# Patient Record
Sex: FEMALE | Race: WHITE | NOT HISPANIC OR LATINO | Employment: FULL TIME | URBAN - METROPOLITAN AREA
[De-identification: names, ages, dates, MRNs, and addresses within clinical notes are randomized per-mention and may not be internally consistent; named-entity substitution may affect disease eponyms.]

---

## 2019-01-28 ENCOUNTER — TELEPHONE (OUTPATIENT)
Dept: SURGERY | Facility: CLINIC | Age: 59
End: 2019-01-28

## 2020-08-06 DIAGNOSIS — Z12.31 ENCOUNTER FOR SCREENING MAMMOGRAM FOR MALIGNANT NEOPLASM OF BREAST: Primary | ICD-10-CM

## 2020-09-09 ENCOUNTER — TELEPHONE (OUTPATIENT)
Dept: ORTHOPEDICS | Facility: CLINIC | Age: 60
End: 2020-09-09

## 2020-09-09 ENCOUNTER — OFFICE VISIT (OUTPATIENT)
Dept: ORTHOPEDICS | Facility: CLINIC | Age: 60
End: 2020-09-09
Payer: MEDICAID

## 2020-09-09 VITALS
RESPIRATION RATE: 20 BRPM | WEIGHT: 108.38 LBS | OXYGEN SATURATION: 98 % | BODY MASS INDEX: 20.46 KG/M2 | HEIGHT: 61 IN | TEMPERATURE: 98 F

## 2020-09-09 DIAGNOSIS — S52.501A CLOSED FRACTURE OF DISTAL END OF RIGHT RADIUS, UNSPECIFIED FRACTURE MORPHOLOGY, INITIAL ENCOUNTER: Primary | ICD-10-CM

## 2020-09-09 PROCEDURE — 99203 OFFICE O/P NEW LOW 30 MIN: CPT | Mod: 57,S$PBB,, | Performed by: ORTHOPAEDIC SURGERY

## 2020-09-09 PROCEDURE — 99214 OFFICE O/P EST MOD 30 MIN: CPT | Mod: PBBFAC,PN | Performed by: ORTHOPAEDIC SURGERY

## 2020-09-09 PROCEDURE — 99999 PR PBB SHADOW E&M-EST. PATIENT-LVL IV: ICD-10-PCS | Mod: PBBFAC,,, | Performed by: ORTHOPAEDIC SURGERY

## 2020-09-09 PROCEDURE — 99203 PR OFFICE/OUTPT VISIT, NEW, LEVL III, 30-44 MIN: ICD-10-PCS | Mod: 57,S$PBB,, | Performed by: ORTHOPAEDIC SURGERY

## 2020-09-09 PROCEDURE — 99999 PR PBB SHADOW E&M-EST. PATIENT-LVL IV: CPT | Mod: PBBFAC,,, | Performed by: ORTHOPAEDIC SURGERY

## 2020-09-09 RX ORDER — FLUTICASONE PROPIONATE 50 MCG
1 SPRAY, SUSPENSION (ML) NASAL DAILY
COMMUNITY
Start: 2020-08-07

## 2020-09-09 RX ORDER — ATORVASTATIN CALCIUM 20 MG/1
20 TABLET, FILM COATED ORAL DAILY
COMMUNITY
Start: 2020-08-07

## 2020-09-09 RX ORDER — LOSARTAN POTASSIUM 50 MG/1
50 TABLET ORAL DAILY
COMMUNITY
Start: 2020-08-07

## 2020-09-09 RX ORDER — ASPIRIN 81 MG/1
81 TABLET ORAL DAILY
COMMUNITY
Start: 2020-08-07

## 2020-09-09 RX ORDER — SODIUM CHLORIDE 9 MG/ML
INJECTION, SOLUTION INTRAVENOUS CONTINUOUS
Status: CANCELLED | OUTPATIENT
Start: 2020-09-09

## 2020-09-09 RX ORDER — DOXEPIN HYDROCHLORIDE 10 MG/1
10 CAPSULE ORAL NIGHTLY
COMMUNITY
Start: 2020-08-07 | End: 2020-09-10

## 2020-09-09 RX ORDER — ERGOCALCIFEROL 1.25 MG/1
50000 CAPSULE ORAL
COMMUNITY
Start: 2020-08-07

## 2020-09-09 RX ORDER — FLUOXETINE HYDROCHLORIDE 20 MG/1
60 CAPSULE ORAL DAILY
COMMUNITY
Start: 2020-08-07 | End: 2020-09-10

## 2020-09-09 RX ORDER — LIDOCAINE HYDROCHLORIDE 10 MG/ML
1 INJECTION, SOLUTION EPIDURAL; INFILTRATION; INTRACAUDAL; PERINEURAL ONCE
Status: DISCONTINUED | OUTPATIENT
Start: 2020-09-09 | End: 2020-09-15 | Stop reason: CLARIF

## 2020-09-09 NOTE — PROGRESS NOTES
Subjective:    Patient ID:  Margie Trevino is a 59 y.o. y.o. female who presents for initial visit for No chief complaint on file.      60 yo female, RHD, reports that she slipped and fell onto her outstretched right hand earlier today injuring her right wrist. She sought initial evaluation at an urgent care facility and was subsequently seen at [facility]note 3] ED. X-rays of the right wrist were obtained and reported to show a displaced distal radius fracture. She was treated with an ortho-glass volar splint and sling. She has been referred for orthopedic follow-up care.           No past medical history on file.     Past Surgical History:   Procedure Laterality Date    AUGMENTATION OF BREAST Bilateral        Review of patient's allergies indicates:  No Known Allergies     No current facility-administered medications for this visit.     Current Outpatient Medications:     HYDROcodone-acetaminophen (NORCO) 5-325 mg per tablet, Take 1 tablet by mouth every 4 (four) hours as needed for Pain., Disp: 18 tablet, Rfl: 0    ibuprofen (ADVIL,MOTRIN) 800 MG tablet, Take 1 tablet (800 mg total) by mouth every 6 (six) hours as needed for Pain., Disp: 20 tablet, Rfl: 0    Social History     Socioeconomic History    Marital status: Single     Spouse name: Not on file    Number of children: Not on file    Years of education: Not on file    Highest education level: Not on file   Occupational History    Not on file   Social Needs    Financial resource strain: Not on file    Food insecurity     Worry: Not on file     Inability: Not on file    Transportation needs     Medical: Not on file     Non-medical: Not on file   Tobacco Use    Smoking status: Current Every Day Smoker     Types: Cigarettes   Substance and Sexual Activity    Alcohol use: Not on file    Drug use: Not on file    Sexual activity: Not on file   Lifestyle    Physical activity     Days per week: Not on file     Minutes per session: Not on file    Stress: Not on  file   Relationships    Social connections     Talks on phone: Not on file     Gets together: Not on file     Attends [employer] service: Not on file     Active member of club or organization: Not on file     Attends meetings of clubs or organizations: Not on file     Relationship status: Not on file   Other Topics Concern    Not on file   Social History Narrative    Not on file        No family history on file.     Review of Systems   Constitutional: Negative for chills and fever.   HENT: Negative for hearing loss.    Eyes: Negative for blurred vision.   Respiratory: Negative for shortness of breath.    Cardiovascular: Negative for chest pain.   Gastrointestinal: Negative for nausea and vomiting.   Genitourinary: Negative for dysuria.   Musculoskeletal: Negative for myalgias.   Skin: Negative for rash.   Neurological: Negative for speech change and loss of consciousness.   Endo/Heme/Allergies: Does not bruise/bleed easily.   Psychiatric/Behavioral: Negative for depression.        Objective:     There were no vitals taken for this visit.    Ortho Exam     58 yo female in NAD; alert, oriented x 3    Right and/wrist: short-arm volar ortho-glass splint in place; N/V intact; no significant swelling hand/digits; no pain passive motion digits    Imaging:     X-rays 3-view right wrist taken today are independently reviewed by me and show a comminuted extra-articular distal radius fracture with dorsal angulation and shortening.       Assessment & Plan:      1. Closed fracture of distal end of right radius, unspecified fracture morphology, initial encounter       1.  Findings, diagnosis, treatment options/risks/benefits were reviewed  2.  Recommend ORIF right distal radius fracture. Surgical risks reviewed including but not limited to infection, wound healing problems, damage to nerves/blood vessels/tendons/ligaments, fracture malunion/non-union, hardware failure, CRPS, wrist/finger stiffness, functional limitation,  persistent pain, reoperation and anesthesia related complications including death. Due to the current coronavirus pandemic despite implementation of recommended precautions, the additional risk of yamilka Covid-19 in the post-operative period was also discussed. Patient voiced understanding of these risks and wishes to proceed with the proposed surgery. Surgery tentatively scheduled for 9/15/2020 as an outpatient procedure at [facility] OR.      Patient was informed and understands the risks of surgery are greater for patients with a current condition or history of heart disease, obesity, clotting disorders, recurrent infections, steroid use, current or past smoking, and factors such as sedentary lifestyle and noncompliance with medications, therapy or follow-up. The degree of the increased risk is hard to estimate with any degree of precision.  3.  Continue right arm splint/sling support  4.  Ice/elevation RUE; minimum TID finger ROM exercises to comfort  5.  Vitamin C 500 mg po bid

## 2020-09-09 NOTE — TELEPHONE ENCOUNTER
----- Message from Jamal Trinidad sent at 9/9/2020  1:23 PM CDT -----  Same Day Appointment Request    Was an appointment with another provider offered?     Reason for FST appt.: displaced fracture of wrist, per Dr. Robertson  Communication Preference:  Additional Information: Pls call pt at [phone], pt currently in ED

## 2020-09-14 ENCOUNTER — TELEPHONE (OUTPATIENT)
Dept: ORTHOPEDICS | Facility: CLINIC | Age: 60
End: 2020-09-14

## 2020-09-14 NOTE — TELEPHONE ENCOUNTER
Spoke with pt. Advised pt that Dr Robertson willjose refill pain medication after surgery tomorrow. All questions answered. Pt verbalized understanding.

## 2020-09-14 NOTE — TELEPHONE ENCOUNTER
----- Message from Summer Wall sent at 9/14/2020  9:11 AM CDT -----  Pt#     Refill:  HYDROcodone-acetaminophen (NORCO) 5-325 mg per tablet      [facility] on [address] phone

## 2020-09-15 PROBLEM — S52.501A CLOSED FRACTURE OF RIGHT DISTAL RADIUS: Status: ACTIVE | Noted: 2020-09-15

## 2020-09-18 ENCOUNTER — TELEPHONE (OUTPATIENT)
Dept: ORTHOPEDICS | Facility: CLINIC | Age: 60
End: 2020-09-18

## 2020-09-18 NOTE — TELEPHONE ENCOUNTER
Spoke with pt. Pt states she is doing well following surgery. Pt states she has scheduled her post op visit. All questions answered. Pt verbalized understanding.

## 2020-09-18 NOTE — TELEPHONE ENCOUNTER
----- Message from Tawny San sent at 9/18/2020  9:40 AM CDT -----  Margie Trevino calling regardinga missed call from Zohra. please call back [phone]

## 2020-09-29 ENCOUNTER — OFFICE VISIT (OUTPATIENT)
Dept: ORTHOPEDICS | Facility: CLINIC | Age: 60
End: 2020-09-29
Payer: MEDICAID

## 2020-09-29 VITALS
BODY MASS INDEX: 20.39 KG/M2 | SYSTOLIC BLOOD PRESSURE: 145 MMHG | HEIGHT: 61 IN | HEART RATE: 68 BPM | WEIGHT: 108 LBS | DIASTOLIC BLOOD PRESSURE: 83 MMHG

## 2020-09-29 DIAGNOSIS — S52.501D CLOSED FRACTURE OF DISTAL END OF RIGHT RADIUS WITH ROUTINE HEALING, UNSPECIFIED FRACTURE MORPHOLOGY, SUBSEQUENT ENCOUNTER: Primary | ICD-10-CM

## 2020-09-29 PROCEDURE — 99999 PR PBB SHADOW E&M-EST. PATIENT-LVL IV: CPT | Mod: PBBFAC,,, | Performed by: ORTHOPAEDIC SURGERY

## 2020-09-29 PROCEDURE — 99024 PR POST-OP FOLLOW-UP VISIT: ICD-10-PCS | Mod: ,,, | Performed by: ORTHOPAEDIC SURGERY

## 2020-09-29 PROCEDURE — 99999 PR PBB SHADOW E&M-EST. PATIENT-LVL IV: ICD-10-PCS | Mod: PBBFAC,,, | Performed by: ORTHOPAEDIC SURGERY

## 2020-09-29 PROCEDURE — 99024 POSTOP FOLLOW-UP VISIT: CPT | Mod: ,,, | Performed by: ORTHOPAEDIC SURGERY

## 2020-09-29 PROCEDURE — 99214 OFFICE O/P EST MOD 30 MIN: CPT | Mod: PBBFAC,PN | Performed by: ORTHOPAEDIC SURGERY

## 2020-09-29 RX ORDER — IBUPROFEN 800 MG/1
800 TABLET ORAL 3 TIMES DAILY
Qty: 90 TABLET | Refills: 0 | Status: SHIPPED | OUTPATIENT
Start: 2020-09-29 | End: 2020-10-29

## 2020-09-29 NOTE — PROGRESS NOTES
"Subjective:    Patient ID:  Margie Trevino is a 59 y.o. y.o. female who presents for f/u visit for Post-op Evaluation and Pain of the Right Wrist      Patient returns for follow-up s/p ORIF right distal radius fracture, DOS: 9/15/2020. Has been comfortable in the splint. Denies any specific problems or complaints. Pain well-controlled with ibuprofen.        Objective:     BP (!) 145/83   Pulse 68   Ht 5' 1" (1.549 m)   Wt 49 kg (108 lb 0.4 oz)   BMI 20.41 kg/m²     Ortho Exam     Right wrist: splint removed; N/V intact; moderate swelling; surgical wound clean/dry/intact, steri-strips in place; limited AROM all planes; painless passive ROM digits      Assessment & Plan:     1. Closed fracture of distal end of right radius with routine healing, unspecified fracture morphology, subsequent encounter        1.  Wound care instructions reviewed  2.  Universal wrist splint; splint wear/care instructions reviewed  3.  Minimum TID wrist/finger ROM exercises to comfort  4.  No lifting heavier than cup of coffee with right hand; no weight bearing RUE  5.  Continue Motrin 800 mg po tid prn; vitamin C 500 mg po bid x additional 4 weeks  6.  OT  7.  Follow-up in one month, x-rays AP/lateral right wrist at that time  "

## 2020-09-30 ENCOUNTER — TELEPHONE (OUTPATIENT)
Dept: ORTHOPEDICS | Facility: CLINIC | Age: 60
End: 2020-09-30

## 2020-09-30 NOTE — TELEPHONE ENCOUNTER
----- Message from Mario Alberto Harvey sent at 9/30/2020  9:34 AM CDT -----  Requesting a call back, regarding brace as well as physical therapy. Please call back.      Contact Info [phone] (ndeb)

## 2020-09-30 NOTE — TELEPHONE ENCOUNTER
Spoke with pt. Pt states she wants to know if she needs to wear the brace all day or just at night. Advised pt she needs to be wearing the brace all day and when she is sleeping just taking it off to shower and do her exercises. Pt states she is still awaiting a phone call from occupational therapy. Advised pt they should be calling her in the next few days. All questions answered. Pt verbalized understanding.

## 2020-11-02 ENCOUNTER — OFFICE VISIT (OUTPATIENT)
Dept: ORTHOPEDICS | Facility: CLINIC | Age: 60
End: 2020-11-02
Payer: MEDICAID

## 2020-11-02 VITALS
HEIGHT: 61 IN | DIASTOLIC BLOOD PRESSURE: 95 MMHG | SYSTOLIC BLOOD PRESSURE: 137 MMHG | WEIGHT: 108 LBS | HEART RATE: 67 BPM | BODY MASS INDEX: 20.39 KG/M2

## 2020-11-02 DIAGNOSIS — S52.501D CLOSED FRACTURE OF DISTAL END OF RIGHT RADIUS WITH ROUTINE HEALING, UNSPECIFIED FRACTURE MORPHOLOGY, SUBSEQUENT ENCOUNTER: Primary | ICD-10-CM

## 2020-11-02 PROCEDURE — 99999 PR PBB SHADOW E&M-EST. PATIENT-LVL III: CPT | Mod: PBBFAC,,, | Performed by: ORTHOPAEDIC SURGERY

## 2020-11-02 PROCEDURE — 99213 OFFICE O/P EST LOW 20 MIN: CPT | Mod: PBBFAC,PN | Performed by: ORTHOPAEDIC SURGERY

## 2020-11-02 PROCEDURE — 99999 PR PBB SHADOW E&M-EST. PATIENT-LVL III: ICD-10-PCS | Mod: PBBFAC,,, | Performed by: ORTHOPAEDIC SURGERY

## 2020-11-02 PROCEDURE — 99024 POSTOP FOLLOW-UP VISIT: CPT | Mod: ,,, | Performed by: ORTHOPAEDIC SURGERY

## 2020-11-02 PROCEDURE — 99024 PR POST-OP FOLLOW-UP VISIT: ICD-10-PCS | Mod: ,,, | Performed by: ORTHOPAEDIC SURGERY

## 2020-11-02 RX ORDER — FLUOXETINE HYDROCHLORIDE 20 MG/1
60 CAPSULE ORAL DAILY
COMMUNITY
Start: 2020-09-30

## 2020-11-03 NOTE — PROGRESS NOTES
"Subjective:    Patient ID:  Margie Trevino is a 59 y.o. y.o. female who presents for f/u visit for Post-op Evaluation and Pain of the Right Wrist      Patient returns for follow-up s/p ORIF right distal radius fracture, DOS: 9/15/2020. Has been wearing splint and attending OT (2 sessions to date). Notes proximal end of suture is showing through skin.        Objective:     BP (!) 137/95 (BP Location: Left arm, Patient Position: Sitting, BP Method: Small (Automatic))   Pulse 67   Ht 5' 1" (1.549 m)   Wt 49 kg (108 lb 0.4 oz)   BMI 20.41 kg/m²     Ortho Exam     Right wrist: N/V intact; mild swelling; surgical wound well-healed with exposed monocryl suture proximally; ROM: 40 extension/30 flexion    Imaging:     X-rays 3-view right wrist taken today are independently reviewed by me and show well-maintained fracture alignment and hardware position; progressive fracture consolidation and diffuse osteopenia noted.       Assessment & Plan:     1. Closed fracture of distal end of right radius with routine healing, unspecified fracture morphology, subsequent encounter        1.  Exposed monocryl suture at proximal wound removed  2.  Wean from right wrist splint  3.  Continue OT, progressive ROM/strengthening to tolerance  4.  Progressive increase right hand use for ADLs to comfort  5.  Follow-up in 6 weeks  "

## 2020-12-14 ENCOUNTER — OFFICE VISIT (OUTPATIENT)
Dept: ORTHOPEDICS | Facility: CLINIC | Age: 60
End: 2020-12-14
Payer: MEDICAID

## 2020-12-14 VITALS
DIASTOLIC BLOOD PRESSURE: 67 MMHG | HEART RATE: 57 BPM | SYSTOLIC BLOOD PRESSURE: 102 MMHG | WEIGHT: 114.31 LBS | HEIGHT: 61 IN | BODY MASS INDEX: 21.58 KG/M2

## 2020-12-14 DIAGNOSIS — S52.501D CLOSED FRACTURE OF DISTAL END OF RIGHT RADIUS WITH ROUTINE HEALING, UNSPECIFIED FRACTURE MORPHOLOGY, SUBSEQUENT ENCOUNTER: Primary | ICD-10-CM

## 2020-12-14 PROCEDURE — 99999 PR PBB SHADOW E&M-EST. PATIENT-LVL III: ICD-10-PCS | Mod: PBBFAC,,, | Performed by: ORTHOPAEDIC SURGERY

## 2020-12-14 PROCEDURE — 99024 POSTOP FOLLOW-UP VISIT: CPT | Mod: ,,, | Performed by: ORTHOPAEDIC SURGERY

## 2020-12-14 PROCEDURE — 99213 OFFICE O/P EST LOW 20 MIN: CPT | Mod: PBBFAC,PN | Performed by: ORTHOPAEDIC SURGERY

## 2020-12-14 PROCEDURE — 99999 PR PBB SHADOW E&M-EST. PATIENT-LVL III: CPT | Mod: PBBFAC,,, | Performed by: ORTHOPAEDIC SURGERY

## 2020-12-14 PROCEDURE — 99024 PR POST-OP FOLLOW-UP VISIT: ICD-10-PCS | Mod: ,,, | Performed by: ORTHOPAEDIC SURGERY

## 2020-12-14 RX ORDER — DOXEPIN HYDROCHLORIDE 10 MG/1
10 CAPSULE ORAL NIGHTLY
COMMUNITY
Start: 2020-12-03

## 2020-12-15 NOTE — PROGRESS NOTES
"Subjective:    Patient ID:  Margie Trevino is a 59 y.o. y.o. female who presents for f/u visit for Injury and Pain of the Right Wrist      Patient returns for follow-up s/p ORIF right distal radius fracture, DOS: 9/15/2020. Notes improving strength and motion. Feels she may be "favoring" her right wrist too much. Has been attending OT.          Objective:     /67 (BP Location: Right arm, Patient Position: Sitting, BP Method: Small (Automatic))   Pulse (!) 57   Ht 5' 1" (1.549 m)   Wt 51.9 kg (114 lb 4.9 oz)   BMI 21.60 kg/m²     Ortho Exam     59 to female in NAD; alert, oriented x 3; normal mood and affect    Head: atraumatic  Eyes: EOM are normal. Right eye exhibits no discharge. Left eye exhibits no discharge  Cardiovascular: normal rate    Pulmonary/Chest: effort normal; no respiratory distress  Abdominal: soft    Right wrist: N/V intact; mild swelling; surgical wound well-healed; no focal tenderness; ROM: 60 extension/45 flexion, full pronation/supination      Imaging:     X-rays 3-view right wrist taken today are independently reviewed by me and show well-maintained fracture alignment and hardware position; progressive fracture consolidation; disuse osteopenia noted.       Assessment & Plan:     1. Closed fracture of distal end of right radius with routine healing, unspecified fracture morphology, subsequent encounter        1.  Continue OT, transitioning to HEP at therapist's discretion  2.  Progressive increase in right hand use to tolerance  3.  Follow-up in 6-8 weeks  "

## 2021-01-13 ENCOUNTER — TELEPHONE (OUTPATIENT)
Dept: ORTHOPEDICS | Facility: CLINIC | Age: 61
End: 2021-01-13

## 2021-02-17 ENCOUNTER — OFFICE VISIT (OUTPATIENT)
Dept: ORTHOPEDICS | Facility: CLINIC | Age: 61
End: 2021-02-17
Payer: MEDICAID

## 2021-02-17 VITALS
SYSTOLIC BLOOD PRESSURE: 116 MMHG | RESPIRATION RATE: 16 BRPM | HEART RATE: 65 BPM | WEIGHT: 117.31 LBS | HEIGHT: 61 IN | BODY MASS INDEX: 22.15 KG/M2 | DIASTOLIC BLOOD PRESSURE: 73 MMHG

## 2021-02-17 DIAGNOSIS — S52.501D CLOSED FRACTURE OF DISTAL END OF RIGHT RADIUS WITH ROUTINE HEALING, UNSPECIFIED FRACTURE MORPHOLOGY, SUBSEQUENT ENCOUNTER: Primary | ICD-10-CM

## 2021-02-17 PROCEDURE — 99213 OFFICE O/P EST LOW 20 MIN: CPT | Mod: PBBFAC,PN | Performed by: ORTHOPAEDIC SURGERY

## 2021-02-17 PROCEDURE — 99212 PR OFFICE/OUTPT VISIT, EST, LEVL II, 10-19 MIN: ICD-10-PCS | Mod: S$PBB,,, | Performed by: ORTHOPAEDIC SURGERY

## 2021-02-17 PROCEDURE — 99999 PR PBB SHADOW E&M-EST. PATIENT-LVL III: ICD-10-PCS | Mod: PBBFAC,,, | Performed by: ORTHOPAEDIC SURGERY

## 2021-02-17 PROCEDURE — 99999 PR PBB SHADOW E&M-EST. PATIENT-LVL III: CPT | Mod: PBBFAC,,, | Performed by: ORTHOPAEDIC SURGERY

## 2021-02-17 PROCEDURE — 99212 OFFICE O/P EST SF 10 MIN: CPT | Mod: S$PBB,,, | Performed by: ORTHOPAEDIC SURGERY

## 2021-02-18 ENCOUNTER — TELEPHONE (OUTPATIENT)
Dept: ORTHOPEDICS | Facility: CLINIC | Age: 61
End: 2021-02-18

## 2021-02-22 ENCOUNTER — TELEPHONE (OUTPATIENT)
Dept: ORTHOPEDICS | Facility: CLINIC | Age: 61
End: 2021-02-22

## 2021-02-23 ENCOUNTER — TELEPHONE (OUTPATIENT)
Dept: ORTHOPEDICS | Facility: CLINIC | Age: 61
End: 2021-02-23

## 2021-03-03 ENCOUNTER — OFFICE VISIT (OUTPATIENT)
Dept: ORTHOPEDICS | Facility: CLINIC | Age: 61
End: 2021-03-03
Payer: MEDICAID

## 2021-03-03 VITALS — HEIGHT: 61 IN | BODY MASS INDEX: 21.36 KG/M2 | TEMPERATURE: 98 F | WEIGHT: 113.13 LBS | RESPIRATION RATE: 18 BRPM

## 2021-03-03 DIAGNOSIS — S52.501D CLOSED FRACTURE OF DISTAL END OF RIGHT RADIUS WITH ROUTINE HEALING, UNSPECIFIED FRACTURE MORPHOLOGY, SUBSEQUENT ENCOUNTER: Primary | ICD-10-CM

## 2021-03-03 PROCEDURE — 99999 PR PBB SHADOW E&M-EST. PATIENT-LVL III: ICD-10-PCS | Mod: PBBFAC,,, | Performed by: ORTHOPAEDIC SURGERY

## 2021-03-03 PROCEDURE — 99999 PR PBB SHADOW E&M-EST. PATIENT-LVL III: CPT | Mod: PBBFAC,,, | Performed by: ORTHOPAEDIC SURGERY

## 2021-03-03 PROCEDURE — 99212 OFFICE O/P EST SF 10 MIN: CPT | Mod: S$PBB,,, | Performed by: ORTHOPAEDIC SURGERY

## 2021-03-03 PROCEDURE — 99212 PR OFFICE/OUTPT VISIT, EST, LEVL II, 10-19 MIN: ICD-10-PCS | Mod: S$PBB,,, | Performed by: ORTHOPAEDIC SURGERY

## 2021-03-03 PROCEDURE — 99213 OFFICE O/P EST LOW 20 MIN: CPT | Mod: PBBFAC,PN | Performed by: ORTHOPAEDIC SURGERY

## 2021-04-16 ENCOUNTER — PATIENT MESSAGE (OUTPATIENT)
Dept: RESEARCH | Facility: HOSPITAL | Age: 61
End: 2021-04-16

## 2021-05-04 ENCOUNTER — TELEPHONE (OUTPATIENT)
Dept: ORTHOPEDICS | Facility: CLINIC | Age: 61
End: 2021-05-04

## 2021-05-21 ENCOUNTER — TELEPHONE (OUTPATIENT)
Dept: ORTHOPEDICS | Facility: CLINIC | Age: 61
End: 2021-05-21

## 2021-05-24 ENCOUNTER — OFFICE VISIT (OUTPATIENT)
Dept: ORTHOPEDICS | Facility: CLINIC | Age: 61
End: 2021-05-24
Payer: MEDICAID

## 2021-05-24 VITALS
DIASTOLIC BLOOD PRESSURE: 81 MMHG | WEIGHT: 118.06 LBS | HEART RATE: 62 BPM | BODY MASS INDEX: 22.29 KG/M2 | HEIGHT: 61 IN | SYSTOLIC BLOOD PRESSURE: 120 MMHG

## 2021-05-24 DIAGNOSIS — M65.4 DE QUERVAIN'S DISEASE (RADIAL STYLOID TENOSYNOVITIS): Primary | ICD-10-CM

## 2021-05-24 PROCEDURE — 99999 PR PBB SHADOW E&M-EST. PATIENT-LVL III: ICD-10-PCS | Mod: PBBFAC,,, | Performed by: ORTHOPAEDIC SURGERY

## 2021-05-24 PROCEDURE — 99999 PR PBB SHADOW E&M-EST. PATIENT-LVL III: CPT | Mod: PBBFAC,,, | Performed by: ORTHOPAEDIC SURGERY

## 2021-05-24 PROCEDURE — 99213 OFFICE O/P EST LOW 20 MIN: CPT | Mod: PBBFAC,PN | Performed by: ORTHOPAEDIC SURGERY

## 2021-05-24 PROCEDURE — 99213 PR OFFICE/OUTPT VISIT, EST, LEVL III, 20-29 MIN: ICD-10-PCS | Mod: S$PBB,,, | Performed by: ORTHOPAEDIC SURGERY

## 2021-05-24 PROCEDURE — 99213 OFFICE O/P EST LOW 20 MIN: CPT | Mod: S$PBB,,, | Performed by: ORTHOPAEDIC SURGERY

## 2021-05-24 RX ORDER — IBUPROFEN 800 MG/1
800 TABLET ORAL 3 TIMES DAILY
COMMUNITY
Start: 2021-05-22 | End: 2022-08-08 | Stop reason: ALTCHOICE

## 2021-05-24 RX ORDER — ALBUTEROL SULFATE 90 UG/1
2 AEROSOL, METERED RESPIRATORY (INHALATION) EVERY 4 HOURS PRN
COMMUNITY
Start: 2021-05-22

## 2021-05-24 RX ORDER — DOXEPIN HYDROCHLORIDE 25 MG/1
25 CAPSULE ORAL DAILY
COMMUNITY
Start: 2021-05-22

## 2021-06-29 DIAGNOSIS — Z12.31 ENCOUNTER FOR SCREENING MAMMOGRAM FOR MALIGNANT NEOPLASM OF BREAST: Primary | ICD-10-CM

## 2021-12-14 ENCOUNTER — TELEPHONE (OUTPATIENT)
Dept: SURGERY | Facility: CLINIC | Age: 61
End: 2021-12-14
Payer: MEDICAID

## 2021-12-20 ENCOUNTER — TELEPHONE (OUTPATIENT)
Dept: ORTHOPEDICS | Facility: CLINIC | Age: 61
End: 2021-12-20
Payer: MEDICAID

## 2022-04-13 ENCOUNTER — TELEPHONE (OUTPATIENT)
Dept: ORTHOPEDICS | Facility: CLINIC | Age: 62
End: 2022-04-13
Payer: MEDICAID

## 2022-04-13 NOTE — TELEPHONE ENCOUNTER
----- Message from Doris Khalil, Patient Care Assistant sent at 4/13/2022 11:02 AM CDT -----  Regarding: appt  Contact: Pt  Pt is requesting a call back in regards to scheduling appt. Pt states she went to the ER on yesterday and was told she has a fracture in her arm. Pt is asking to be seen today if possible. Pt states she needs to be seen as soon as possible and would like to come today due to sister having appt for this evening. Pt is asking that someone contact her back when scheduling this appt.      Pt @ [phone]

## 2022-04-18 ENCOUNTER — TELEPHONE (OUTPATIENT)
Dept: ORTHOPEDICS | Facility: CLINIC | Age: 62
End: 2022-04-18
Payer: MEDICAID

## 2022-04-18 NOTE — TELEPHONE ENCOUNTER
----- Message from Tawny San sent at 4/18/2022  8:51 AM CDT -----  Margie Trevino calling regarding Patient Advice (message) about appt for fracture of head of left radius.  she stated the ER stated the doctors office need to see her within  72 hrs.   [phone]

## 2022-04-19 ENCOUNTER — OFFICE VISIT (OUTPATIENT)
Dept: ORTHOPEDICS | Facility: CLINIC | Age: 62
End: 2022-04-19
Payer: MEDICAID

## 2022-04-19 VITALS
HEIGHT: 61 IN | WEIGHT: 133.94 LBS | BODY MASS INDEX: 25.29 KG/M2 | DIASTOLIC BLOOD PRESSURE: 97 MMHG | HEART RATE: 70 BPM | SYSTOLIC BLOOD PRESSURE: 148 MMHG

## 2022-04-19 DIAGNOSIS — S52.122A CLOSED DISPLACED FRACTURE OF HEAD OF LEFT RADIUS, INITIAL ENCOUNTER: Primary | ICD-10-CM

## 2022-04-19 PROCEDURE — 4010F ACE/ARB THERAPY RXD/TAKEN: CPT | Mod: CPTII,,, | Performed by: ORTHOPAEDIC SURGERY

## 2022-04-19 PROCEDURE — 3077F SYST BP >= 140 MM HG: CPT | Mod: CPTII,,, | Performed by: ORTHOPAEDIC SURGERY

## 2022-04-19 PROCEDURE — 99999 PR PBB SHADOW E&M-EST. PATIENT-LVL V: CPT | Mod: PBBFAC,,, | Performed by: ORTHOPAEDIC SURGERY

## 2022-04-19 PROCEDURE — 3080F PR MOST RECENT DIASTOLIC BLOOD PRESSURE >= 90 MM HG: ICD-10-PCS | Mod: CPTII,,, | Performed by: ORTHOPAEDIC SURGERY

## 2022-04-19 PROCEDURE — 99213 PR OFFICE/OUTPT VISIT, EST, LEVL III, 20-29 MIN: ICD-10-PCS | Mod: S$PBB,,, | Performed by: ORTHOPAEDIC SURGERY

## 2022-04-19 PROCEDURE — 1160F PR REVIEW ALL MEDS BY PRESCRIBER/CLIN PHARMACIST DOCUMENTED: ICD-10-PCS | Mod: CPTII,,, | Performed by: ORTHOPAEDIC SURGERY

## 2022-04-19 PROCEDURE — 3077F PR MOST RECENT SYSTOLIC BLOOD PRESSURE >= 140 MM HG: ICD-10-PCS | Mod: CPTII,,, | Performed by: ORTHOPAEDIC SURGERY

## 2022-04-19 PROCEDURE — 1160F RVW MEDS BY RX/DR IN RCRD: CPT | Mod: CPTII,,, | Performed by: ORTHOPAEDIC SURGERY

## 2022-04-19 PROCEDURE — 99213 OFFICE O/P EST LOW 20 MIN: CPT | Mod: S$PBB,,, | Performed by: ORTHOPAEDIC SURGERY

## 2022-04-19 PROCEDURE — 1159F PR MEDICATION LIST DOCUMENTED IN MEDICAL RECORD: ICD-10-PCS | Mod: CPTII,,, | Performed by: ORTHOPAEDIC SURGERY

## 2022-04-19 PROCEDURE — 3080F DIAST BP >= 90 MM HG: CPT | Mod: CPTII,,, | Performed by: ORTHOPAEDIC SURGERY

## 2022-04-19 PROCEDURE — 99999 PR PBB SHADOW E&M-EST. PATIENT-LVL V: ICD-10-PCS | Mod: PBBFAC,,, | Performed by: ORTHOPAEDIC SURGERY

## 2022-04-19 PROCEDURE — 1159F MED LIST DOCD IN RCRD: CPT | Mod: CPTII,,, | Performed by: ORTHOPAEDIC SURGERY

## 2022-04-19 PROCEDURE — 3008F BODY MASS INDEX DOCD: CPT | Mod: CPTII,,, | Performed by: ORTHOPAEDIC SURGERY

## 2022-04-19 PROCEDURE — 3008F PR BODY MASS INDEX (BMI) DOCUMENTED: ICD-10-PCS | Mod: CPTII,,, | Performed by: ORTHOPAEDIC SURGERY

## 2022-04-19 PROCEDURE — 99215 OFFICE O/P EST HI 40 MIN: CPT | Mod: PBBFAC,PN | Performed by: ORTHOPAEDIC SURGERY

## 2022-04-19 PROCEDURE — 4010F PR ACE/ARB THEARPY RXD/TAKEN: ICD-10-PCS | Mod: CPTII,,, | Performed by: ORTHOPAEDIC SURGERY

## 2022-04-19 RX ORDER — LORAZEPAM 1 MG/1
TABLET ORAL
COMMUNITY
Start: 2022-04-07

## 2022-04-19 RX ORDER — AZELASTINE 1 MG/ML
2 SPRAY, METERED NASAL 2 TIMES DAILY
COMMUNITY
Start: 2022-04-07

## 2022-04-19 RX ORDER — HYDROCODONE BITARTRATE AND ACETAMINOPHEN 5; 325 MG/1; MG/1
1 TABLET ORAL EVERY 6 HOURS PRN
Qty: 10 TABLET | Refills: 0 | OUTPATIENT
Start: 2022-04-19 | End: 2022-08-08

## 2022-04-19 RX ORDER — IBUPROFEN 200 MG
TABLET ORAL
COMMUNITY
Start: 2022-04-07

## 2024-06-05 ENCOUNTER — TELEPHONE (OUTPATIENT)
Dept: CARDIOLOGY | Facility: CLINIC | Age: 64
End: 2024-06-05
Payer: MEDICAID

## 2024-06-05 NOTE — TELEPHONE ENCOUNTER
----- Message from Renetta Polanco sent at 6/5/2024  8:45 AM CDT -----  Regarding: Ext referral  Good morning,    Current pt is being referred to cardio from NP Jennifer Bazzi for Screening for CAD, no appts were populating. I have scanned the referral and records in to media mgr. Please advise or contact pt to schedule and let me know if I can help any further.    Thank you,  Renetta Polanco  Clinic   Ext [phone]

## 2024-06-05 NOTE — TELEPHONE ENCOUNTER
LVM for patient, calling regarding a referral received from HORTENCIA Bazzi for you to be evaluated by a cardiologist in [place].  You may call me back at [phone] to assist in scheduling the appointment.

## 2024-07-08 ENCOUNTER — TELEPHONE (OUTPATIENT)
Dept: CARDIOLOGY | Facility: CLINIC | Age: 64
End: 2024-07-08
Payer: MEDICAID

## 2024-07-08 NOTE — TELEPHONE ENCOUNTER
----- Message from Julian Wilson sent at 7/8/2024  4:44 PM CDT -----  Contact: [phone]  Pt is calling to see if a referral was received from Dr. Fabian. Please call back to further assist.

## 2024-07-08 NOTE — TELEPHONE ENCOUNTER
Spoke with patient, we did receive referral from PCP.  Scheduled next available appointment with provider.  Patient verbalized understanding.

## 2024-10-03 ENCOUNTER — TELEPHONE (OUTPATIENT)
Dept: ORTHOPEDICS | Facility: CLINIC | Age: 64
End: 2024-10-03
Payer: MEDICAID

## 2024-10-03 NOTE — TELEPHONE ENCOUNTER
----- Message from Elsie sent at 10/3/2024 12:07 PM CDT -----  Contact: pt @[phone]  Margie Trevino calling regarding Appointment Access  (message) for #pt is calling to get appt, pt last saw Dr. Robertson on 4/19/22, asking for call back

## 2024-11-06 DIAGNOSIS — M25.551 RIGHT HIP PAIN: Primary | ICD-10-CM

## 2024-11-07 ENCOUNTER — OFFICE VISIT (OUTPATIENT)
Dept: ORTHOPEDICS | Facility: CLINIC | Age: 64
End: 2024-11-07
Payer: MEDICAID

## 2024-11-07 VITALS
SYSTOLIC BLOOD PRESSURE: 110 MMHG | HEART RATE: 74 BPM | DIASTOLIC BLOOD PRESSURE: 86 MMHG | BODY MASS INDEX: 25.1 KG/M2 | WEIGHT: 132.94 LBS | HEIGHT: 61 IN

## 2024-11-07 DIAGNOSIS — M16.11 PRIMARY OSTEOARTHRITIS OF RIGHT HIP: Primary | ICD-10-CM

## 2024-11-07 PROCEDURE — 1159F MED LIST DOCD IN RCRD: CPT | Mod: CPTII,,,

## 2024-11-07 PROCEDURE — 4010F ACE/ARB THERAPY RXD/TAKEN: CPT | Mod: CPTII,,,

## 2024-11-07 PROCEDURE — 3079F DIAST BP 80-89 MM HG: CPT | Mod: CPTII,,,

## 2024-11-07 PROCEDURE — 3008F BODY MASS INDEX DOCD: CPT | Mod: CPTII,,,

## 2024-11-07 PROCEDURE — 99999 PR PBB SHADOW E&M-EST. PATIENT-LVL V: CPT | Mod: PBBFAC,,,

## 2024-11-07 PROCEDURE — 99213 OFFICE O/P EST LOW 20 MIN: CPT | Mod: S$PBB,,,

## 2024-11-07 PROCEDURE — 3074F SYST BP LT 130 MM HG: CPT | Mod: CPTII,,,

## 2024-11-07 PROCEDURE — 99215 OFFICE O/P EST HI 40 MIN: CPT | Mod: PBBFAC,PN

## 2024-11-07 PROCEDURE — 1160F RVW MEDS BY RX/DR IN RCRD: CPT | Mod: CPTII,,,

## 2024-11-07 NOTE — PROGRESS NOTES
Patient ID: Margie Trevino is a 63 y.o. female    Pain of the Right Hip    History of Present Illness:    Margie Trevino presents to clinic for right hip pain. Patient denies known LUZMA. The pain started 2 months ago and is becoming progressively worse. She did mop one day with an industrial mop and had significant low back pain that radiates into medial knee and down into her lower right leg to her foot.  Pain starts in low back, radiates down leg in posterior aspect down to foot.  She reports that the pain is a 10 /10 sharp aching pain today. The pain is affecting ADLs and limiting desired level of activity. Denies any bladder or bowl dysfunction or saddle anesthesia.     Trial of  ibuprofen and norco,   with little improvement.     PCP: Jennifer Bazzi NP    Occupation: [employer] employee and [employer] employee    Ambulating: unassisted  Diabetic: no  Smoking: current  Hx of DVT/PE: no    PAST MEDICAL HISTORY:   Past Medical History:   Diagnosis Date    Asthma     Chronic hepatitis C     HLD (hyperlipidemia)     Hypertension     Vitamin D deficiency      PAST SURGICAL HISTORY:   Past Surgical History:   Procedure Laterality Date    APPENDECTOMY      AUGMENTATION OF BREAST Bilateral     HERNIA REPAIR      OPEN REDUCTION AND INTERNAL FIXATION (ORIF) OF FRACTURE OF DISTAL RADIUS Right 9/15/2020    Procedure: ORIF, FRACTURE, RADIUS, DISTAL;  Surgeon: Iraj Robertson MD;  Location: [facility];  Service: Orthopedics;  Laterality: Right;     FAMILY HISTORY: No family history on file.  SOCIAL HISTORY:   Social History     Occupational History    Not on file   Tobacco Use    Smoking status: Every Day     Current packs/day: 1.00     Types: Cigarettes    Smokeless tobacco: Never   Substance and Sexual Activity    Alcohol use: Yes     Comment: occasionally    Drug use: Never    Sexual activity: Not on file        MEDICATIONS:   Current Outpatient Medications:     albuterol (PROVENTIL/VENTOLIN HFA) 90 mcg/actuation inhaler,  Inhale 2 puffs into the lungs every 4 (four) hours as needed., Disp: , Rfl:     aspirin (ECOTRIN) 81 MG EC tablet, Take 81 mg by mouth once daily., Disp: , Rfl:     atorvastatin (LIPITOR) 20 MG tablet, Take 20 mg by mouth once daily., Disp: , Rfl:     azelastine (ASTELIN) 137 mcg (0.1 %) nasal spray, 2 sprays 2 (two) times daily., Disp: , Rfl:     doxepin (SINEQUAN) 10 MG capsule, Take 10 mg by mouth nightly., Disp: , Rfl:     doxepin (SINEQUAN) 25 MG capsule, Take 25 mg by mouth once daily., Disp: , Rfl:     FLUoxetine 20 MG capsule, Take 60 mg by mouth once daily., Disp: , Rfl:     fluticasone propionate (FLONASE) 50 mcg/actuation nasal spray, 1 spray by Each Nostril route once daily., Disp: , Rfl:     HYDROcodone-acetaminophen (NORCO) 5-325 mg per tablet, Take 1 tablet by mouth every 6 (six) hours as needed for Pain. The medication you have been prescribed may cause drowsiness and impair your judgement.  Therefore, you should avoid driving, climbing, using machinery, etc., so as not to increase your risk of injury.  Do NOT drink any alcohol while on this medication(s). It is also addictive., Disp: 4 tablet, Rfl: 0    LORazepam (ATIVAN) 1 MG tablet, TAKE ONE TABLET BY MOUTH TWICE DAILY IF NEEDED FOR 30 DAYS, Disp: , Rfl:     losartan (COZAAR) 50 MG tablet, Take 50 mg by mouth once daily., Disp: , Rfl:     nicotine (NICODERM CQ) 21 mg/24 hr, Apply 1 patch every day by transdermal route for 42 days., Disp: , Rfl:     VITAMIN D2 1,250 mcg (50,000 unit) capsule, Take 50,000 Units by mouth every 7 days., Disp: , Rfl:     Current Facility-Administered Medications:     acetaminophen tablet 650 mg, 650 mg, Oral, Once PRN, Percle, Bethany A., NP    acetaminophen tablet 650 mg, 650 mg, Oral, Once PRN, Percle, Bethany A., NP    albuterol inhaler 2 puff, 2 puff, Inhalation, Q20 Min PRN, Percle, Bethany A., NP    albuterol inhaler 2 puff, 2 puff, Inhalation, Q20 Min PRN, Bethany Hwang, NP    casirivimab-imdevimab  (co-formulated) 600 mg-600 mg injection, 600 mg, Subcutaneous, 1 time in Clinic/HOD, Percle, Bethany A., NP    diphenhydrAMINE capsule 25 mg, 25 mg, Oral, Once PRN, Percle, Bethany A., NP    diphenhydrAMINE capsule 25 mg, 25 mg, Oral, Once PRN, Percle, Bethany A., NP    EPINEPHrine (EPIPEN) 0.3 mg/0.3 mL pen injection 0.3 mg, 0.3 mg, Intramuscular, PRN, Percle, Bethany A., NP    EPINEPHrine (EPIPEN) 0.3 mg/0.3 mL pen injection 0.3 mg, 0.3 mg, Intramuscular, PRN, Percle, Bethany A., NP    ondansetron disintegrating tablet 4 mg, 4 mg, Oral, Once PRN, Percle, Bethany A., NP    ondansetron disintegrating tablet 4 mg, 4 mg, Oral, Once PRN, Percle, Bethany A., NP    predniSONE tablet 40 mg, 40 mg, Oral, Once PRN, Percle, Bethany A., NP    predniSONE tablet 40 mg, 40 mg, Oral, Once PRN, Percle, Bethany A., NP  ALLERGIES: Review of patient's allergies indicates:  No Known Allergies      Physical Exam     Vitals:    24 1317   BP: 110/86   Pulse: 74     Alert and oriented to person, place and time. No acute distress. Well-groomed, not ill appearing. Pupils round and reactive, normal respiratory effort, no audible wheezing.     Gait: She  walks with a normal gait.                   EXTREMITIES:  Examination of lower extremities reveals there is no visible mass or deformity.        Right hip:  ROM(IR/ER) 30/30    + FADIR test    - Stinchfield test     Negative trochanteric pain.    Positive straight leg raise.    No warmth    No erythema        The skin over both lower extremities is normal and unremarkable.  She has a  painless range of motion of the knees and ankles bilaterally.   Sensation is intact in both lower extremities.    There are no motor deficits in the lower extremities bilaterally.   Pedal pulses are palpable distally bilaterally.    She has no calf tenderness to palpation nor edema.      Imagin view bilateral Hip X-rays ordered/reviewed by me showing no evidence of fracture or dislocation. There is  no obvious malalignment. No evidence of masses, lesions or foreign bodies.  Moderate bilateral DJD, left worse than right    Assessment & Plan    Primary osteoarthritis of right hip  -     Ambulatory referral/consult to Interventional RAD; Future; Expected date: 11/14/2024         I made the decision to obtain old records of the patient including previous notes and imaging. New imaging was ordered today of the extremity or extremities evaluated. I independently reviewed and interpreted the radiographs and/or MRIs/CT scan today as well as prior imaging.    We discussed at length different treatment options including conservative vs surgical management. These include anti-inflammatories, acetaminophen, rest, ice, heat, formal physical therapy including strengthening and stretching exercises, home exercise programs, injections, dry needling, and finally surgical intervention.      Patient here for acute on chronic right hip pain.  She does have hip arthritis noted on x-ray however her symptoms are more consistent with a lumbar etiology.  We discussed trial of diagnostic/therapeutic intra-articular hip injection under fluoroscopy to see if this improves her symptoms.  If injection improves her symptoms, her pain is likely intra-articular in nature.  If injection does not help her symptoms, her pain is likely lumbar in nature.  Pending efficacy of injection, may consider repeat injections in the future versus NANCY versus referral to Back and Spine.    Referral to interventional radiology for intra-articular right hip injection under fluoroscopy     Follow up:  Two weeks post-injection with  X-rays next visit:  None    All questions were answered and patient is agreeable to the above plan.

## 2024-11-08 ENCOUNTER — TELEPHONE (OUTPATIENT)
Dept: INTERVENTIONAL RADIOLOGY/VASCULAR | Facility: CLINIC | Age: 64
End: 2024-11-08
Payer: MEDICAID

## 2024-11-08 NOTE — TELEPHONE ENCOUNTER
----- Message from Flor Wilkinson MD sent at 2024  1:37 PM CST -----  Regarding: RE: Order for AYO RAMIRES  Procedure: R hip steroid injection  Procedure duration: 1h  Level of sedation: local  [place]:    Performing physician (if any specific): any  Procedure room: [facility]   Clinic visit: No  Priority: Non- Urgent  Snapboard comments: R hip steroid injection; Dr. Buzz Muñiz  Other comments: N/A  ----- Message -----  From: Mary Fairbanks PA-C  Sent: 2024   1:37 PM CST  To: [facility] Body Request Pool  Subject: Order for AYO RAMIRES                            Patient Name: AYO RAMIRES([id number])  Sex: Female  : 1960      PCP: HANNAH KINCAID    Center: [facility]     Types of orders made on 2024: Outpatient Referral    Order Date:2024  Ordering User:MARY FAIRBANKS [[id number]]  Encounter Provider:Mary Fairbanks PA-C [[id number]]  Authorizing Provider: Mary Fairbanks PA-C [[id number]]  Supervising Provider:BUZZ MUÑIZ [[id number]]  Type of Supervision:Supervision Required  Department:[facility][[id number]]    Order Specific Information  Order: Ambulatory referral/consult to Interventional RAD [Custom: [id number]]           Order #: [id number]: 1 FUTURE    Priority: Routine  Class: Internal Referral    Resulting Agency: [facility]    Future Order Information      Expires on:2025            Expected by:2024                   Associated Diagnoses      M16.11 Primary osteoarthritis of right hip      Type of consult: -> IR Body         Procedure Requested: -> Other (specify) Cmt: right intra-articular hip                            injection        Patient taking meds (hold 5 days) -> Pt not on Blood thinners         Contrast allergy: -> No         Patient instructions:      <!--EPICS-->     Dexa scan- test for bone density, generally done at 65 y/o     <!--EPICE-->    Priority: Routine  Class: Internal Referral     Resulting Agency: [employer]    Future Order Information      Expires on:12/07/2025            Expected by:11/14/2024                   Associated Diagnoses      M16.11 Primary osteoarthritis of right hip      Type of consult: -> IR Body         Procedure Requested: -> Other (specify) Cmt: right intra-articular hip                            injection        Patient taking meds (hold 5 days) -> Pt not on Blood thinners         Contrast allergy: -> No

## 2024-11-13 DIAGNOSIS — M16.11 PRIMARY OSTEOARTHRITIS OF RIGHT HIP: Primary | ICD-10-CM

## 2024-11-15 ENCOUNTER — PATIENT MESSAGE (OUTPATIENT)
Dept: INTERVENTIONAL RADIOLOGY/VASCULAR | Facility: HOSPITAL | Age: 64
End: 2024-11-15
Payer: MEDICAID

## 2024-11-15 ENCOUNTER — TELEPHONE (OUTPATIENT)
Dept: ORTHOPEDICS | Facility: CLINIC | Age: 64
End: 2024-11-15
Payer: MEDICAID

## 2024-11-15 NOTE — TELEPHONE ENCOUNTER
----- Message from Buzz Muñiz MD sent at 11/15/2024 12:31 PM CST -----    ----- Message -----  From: Steffanie Bassett  Sent: 11/15/2024  12:01 PM CST  To: Buzz Muñiz MD    Hi,    Mrs. Hanson is scheduled for R hip injection on 11/19/24 in IR.  She states she had injection in her Dr. Office already on 11/7.  Does she still need this appt?  She also has questions about IR appt.  She would like to know if this injection will prevent her from receiving pain medication in the future.  She states if someone does not call and answer her questions before her appt on Tuesday, she is not coming to appt.

## 2024-11-15 NOTE — TELEPHONE ENCOUNTER
Spoke with patient.  Instructed her that having her right hip injection should not prevent her from getting any pain medication or other injections.  She just can not have another hip injection for at least 3 months.  Nor can she have any elective hip replacements for 3 months.  It is up to her primary care and other providers if they are going to continue to give her pain medication.  However the hip injection will not be the cause of this.  I reiterated this information to her multiple times.  Patient verbalized understanding.

## 2024-11-18 ENCOUNTER — TELEPHONE (OUTPATIENT)
Dept: ORTHOPEDICS | Facility: CLINIC | Age: 64
End: 2024-11-18
Payer: MEDICAID

## 2024-11-18 ENCOUNTER — TELEPHONE (OUTPATIENT)
Dept: INTERVENTIONAL RADIOLOGY/VASCULAR | Facility: HOSPITAL | Age: 64
End: 2024-11-18
Payer: MEDICAID

## 2024-11-18 NOTE — NURSING
Pre-procedure call complete.  2 patient identifier used (name and ).   Arrival time 6:30am.      Patient aware will need someone to provide transport home and monitor pt 8 hours post procedure.   Arrival time and location given.  Patient verbalized understanding of all pre-procedure instructions.  Written instructions and directions sent to patient in Mychart/portal.

## 2024-11-18 NOTE — TELEPHONE ENCOUNTER
Received secure chat from IR department that patient had additional questions for me.  I spoke with her Friday however called again today and left voicemail returning her call.

## 2024-11-19 ENCOUNTER — HOSPITAL ENCOUNTER (OUTPATIENT)
Dept: INTERVENTIONAL RADIOLOGY/VASCULAR | Facility: HOSPITAL | Age: 64
Discharge: HOME | End: 2024-11-19
Attending: NURSE PRACTITIONER
Payer: MEDICAID

## 2024-11-19 VITALS
RESPIRATION RATE: 16 BRPM | HEIGHT: 61 IN | BODY MASS INDEX: 23.22 KG/M2 | HEART RATE: 68 BPM | DIASTOLIC BLOOD PRESSURE: 76 MMHG | TEMPERATURE: 98 F | WEIGHT: 123 LBS | SYSTOLIC BLOOD PRESSURE: 165 MMHG | OXYGEN SATURATION: 98 %

## 2024-11-19 DIAGNOSIS — M16.11 PRIMARY OSTEOARTHRITIS OF RIGHT HIP: ICD-10-CM

## 2024-11-19 PROCEDURE — 63600175 PHARM REV CODE 636 W HCPCS: Performed by: STUDENT IN AN ORGANIZED HEALTH CARE EDUCATION/TRAINING PROGRAM

## 2024-11-19 PROCEDURE — 94761 N-INVAS EAR/PLS OXIMETRY MLT: CPT

## 2024-11-19 PROCEDURE — 99900035 HC TECH TIME PER 15 MIN (STAT)

## 2024-11-19 PROCEDURE — 25500020 PHARM REV CODE 255: Performed by: STUDENT IN AN ORGANIZED HEALTH CARE EDUCATION/TRAINING PROGRAM

## 2024-11-19 RX ORDER — LIDOCAINE HYDROCHLORIDE 20 MG/ML
INJECTION, SOLUTION EPIDURAL; INFILTRATION; INTRACAUDAL; PERINEURAL
Status: COMPLETED | OUTPATIENT
Start: 2024-11-19 | End: 2024-11-19

## 2024-11-19 RX ORDER — BUPIVACAINE HYDROCHLORIDE 2.5 MG/ML
INJECTION, SOLUTION EPIDURAL; INFILTRATION; INTRACAUDAL
Status: COMPLETED | OUTPATIENT
Start: 2024-11-19 | End: 2024-11-19

## 2024-11-19 RX ORDER — IBUPROFEN 800 MG/1
800 TABLET ORAL 3 TIMES DAILY
COMMUNITY

## 2024-11-19 RX ORDER — TRIAMCINOLONE ACETONIDE 40 MG/ML
INJECTION, SUSPENSION INTRA-ARTICULAR; INTRAMUSCULAR
Status: COMPLETED | OUTPATIENT
Start: 2024-11-19 | End: 2024-11-19

## 2024-11-19 RX ADMIN — TRIAMCINOLONE ACETONIDE 40 MG: 40 INJECTION, SUSPENSION INTRA-ARTICULAR; INTRAMUSCULAR at 07:11

## 2024-11-19 RX ADMIN — IOHEXOL 3.5 ML: 350 INJECTION, SOLUTION INTRAVENOUS at 07:11

## 2024-11-19 RX ADMIN — BUPIVACAINE HYDROCHLORIDE 2 ML: 2.5 INJECTION, SOLUTION EPIDURAL; INFILTRATION; INTRACAUDAL; PERINEURAL at 07:11

## 2024-11-19 RX ADMIN — LIDOCAINE HYDROCHLORIDE 5 ML: 20 INJECTION, SOLUTION EPIDURAL; INFILTRATION; INTRACAUDAL; PERINEURAL at 07:11

## 2024-11-19 NOTE — Clinical Note
Right: Hip.   Scrubbed with Chlorhexidine/Alcohol.    Hair: N/A.  Skin prep dry before draping.  Prepped by: Rosa De Los Santos RCS 11/19/2024 7:48 AM.

## 2024-11-19 NOTE — BRIEF OP NOTE
Radiology Post-Procedure Note    Pre Op Diagnosis: right hip pain    Post Op Diagnosis: same    Procedure: right hip injection     Procedure performed by: Riddhi Herrera MD    Written Informed Consent Obtained: Yes    Specimen Removed: NO    Estimated Blood Loss: Minimal    Findings:   Right hip steroid injection.     Patient tolerated procedure well.    Riddhi Herrera MD  Interventional Radiology

## 2024-11-19 NOTE — PLAN OF CARE
Pt in preop bay 30, VSS, meds given and IV inserted. Pt denies any open wounds on body or the use of any weight loss injections. Pt needs procedure consents.

## 2024-11-19 NOTE — PLAN OF CARE
VSS. Right hip band aid dressing dry and intact.  No overt musculoskeletal deficits noted.  Discharge instructions reviewed with patient, patient verbalizes understanding. No complaints of acute  pain or discomfort.  Patient received no sedation.  Discharged to [facility].

## 2024-11-19 NOTE — DISCHARGE SUMMARY
Radiology Discharge Summary      Hospital Course: No complications    Admit Date: 11/19/2024  Discharge Date: 11/19/2024     Instructions Given to Patient: Yes  Diet: Resume prior diet  Activity: activity as tolerated and activity as tolerated     Description of Condition on Discharge: Stable  Vital Signs (Most Recent): Temp: 97.6 °F (36.4 °C) (11/19/24 0625)  Pulse: 68 (11/19/24 0802)  Resp: 16 (11/19/24 0802)  BP: 116/68 (11/19/24 0802)  SpO2: 95 % (11/19/24 0802)    Discharge Disposition: Home    Discharge Diagnosis: right hip pain    Follow up: As scheduled    Riddhi Herrera MD  Interventional Radiology

## 2024-11-19 NOTE — PLAN OF CARE
Pt arrived to [facility] for joint steroid injection. Pt AAO. Pt oriented to unit and staff. Plan of care reviewed with pt and verbalizes understanding. Pt prepped and draped using sterile technique. Pt placed on continuous monitoring. Pt presents comfortable and w/o complaints.Timeout completed with staff present to perform procedure. See charting for all vitals, assessments and medications given.

## 2024-11-19 NOTE — H&P
Interventional Radiology Pre-Procedure History & Physical      Chief Complaint/Reason for Referral: Hip pain    History of Present Illness:  Margie Trevino is a 63 y.o. female who presents for right hip injection.     Past Medical History:   Diagnosis Date    Asthma     Chronic hepatitis C     HLD (hyperlipidemia)     Hypertension     Vitamin D deficiency      Past Surgical History:   Procedure Laterality Date    APPENDECTOMY      AUGMENTATION OF BREAST Bilateral     HERNIA REPAIR      OPEN REDUCTION AND INTERNAL FIXATION (ORIF) OF FRACTURE OF DISTAL RADIUS Right 9/15/2020    Procedure: ORIF, FRACTURE, RADIUS, DISTAL;  Surgeon: Iraj Robertson MD;  Location: [facility];  Service: Orthopedics;  Laterality: Right;       Allergies:   Review of patient's allergies indicates:  No Known Allergies     Home Meds:   Prior to Admission medications    Medication Sig Start Date End Date Taking? Authorizing Provider   atorvastatin (LIPITOR) 20 MG tablet Take 20 mg by mouth once daily. 8/7/20  Yes Provider, Historical   doxepin (SINEQUAN) 25 MG capsule Take 25 mg by mouth once daily. 5/22/21  Yes Provider, Historical   FLUoxetine 20 MG capsule Take 60 mg by mouth once daily. 9/30/20  Yes Provider, Historical   HYDROcodone-acetaminophen (NORCO) 5-325 mg per tablet Take 1 tablet by mouth every 6 (six) hours as needed for Pain. The medication you have been prescribed may cause drowsiness and impair your judgement.  Therefore, you should avoid driving, climbing, using machinery, etc., so as not to increase your risk of injury.  Do NOT drink any alcohol while on this medication(s). It is also addictive. 8/8/22  Yes Korin Richards PA-C   ibuprofen (ADVIL,MOTRIN) 800 MG tablet Take 800 mg by mouth 3 (three) times daily.   Yes Provider, Historical   LORazepam (ATIVAN) 1 MG tablet TAKE ONE TABLET BY MOUTH TWICE DAILY IF NEEDED FOR 30 DAYS 4/7/22  Yes Provider, Historical   losartan (COZAAR) 50 MG tablet Take 100 mg by mouth once  "daily. 8/7/20  Yes Provider, Historical   nicotine (NICODERM CQ) 21 mg/24 hr Apply 1 patch every day by transdermal route for 42 days. 4/7/22  Yes Provider, Historical   albuterol (PROVENTIL/VENTOLIN HFA) 90 mcg/actuation inhaler Inhale 2 puffs into the lungs every 4 (four) hours as needed. 5/22/21   Provider, Historical   aspirin (ECOTRIN) 81 MG EC tablet Take 81 mg by mouth once daily. 8/7/20   Provider, Historical   azelastine (ASTELIN) 137 mcg (0.1 %) nasal spray 2 sprays 2 (two) times daily. 4/7/22   Provider, Historical   doxepin (SINEQUAN) 10 MG capsule Take 10 mg by mouth nightly. 12/3/20   Provider, Historical   fluticasone propionate (FLONASE) 50 mcg/actuation nasal spray 1 spray by Each Nostril route once daily. 8/7/20   Provider, Historical   VITAMIN D2 1,250 mcg (50,000 unit) capsule Take 50,000 Units by mouth every 7 days. 8/7/20   Provider, Historical       Anticoagulation/Antiplatelet Meds: no anticoagulation    Review of Systems:   Hematological: no known coagulopathies  Respiratory: no shortness of breath  Cardiovascular: no chest pain  Gastrointestinal: no abdominal pain  Genitourinary: no dysuria  Musculoskeletal: right hip pain  Neurological: no TIA or stroke symptoms     Physical Exam:  Temp: 97.6 °F (36.4 °C) (11/19/24 0625)  Pulse: 69 (11/19/24 0625)  Resp: 18 (11/19/24 0625)  BP: 136/76 (11/19/24 0625)  SpO2: 95 % (11/19/24 0625)    General: WNWD, NAD  HEENT: Normocephalic, sclera anicteric,   Heart: RRR  Lungs:non distended  Extremities:  no CCE on exposed skin  Neuro: Gross nonfocal    Laboratory:  No results found for: "INR", "PT", "PTT"    Lab Results   Component Value Date    WBC 8.70 03/24/2021    HGB 14.4 03/24/2021    HCT 42.2 03/24/2021    MCV 94 03/24/2021     03/24/2021      Lab Results   Component Value Date    GLU 83 07/07/2022     07/07/2022    K 4.3 07/07/2022     07/07/2022    CO2 19 (L) 07/07/2022    BUN 8 07/07/2022    CREATININE 0.7 07/07/2022    CALCIUM " 9.5 07/07/2022    ALT 13 (L) 03/24/2021    AST 20 03/24/2021    ALBUMIN 4.2 03/24/2021    BILITOT 0.6 03/24/2021       Imaging:  Hip radiograph was reviewed.    Assessment/Plan:  63 y.o. female with right hip pain. Will undergo hip injection today.    Sedation:  local    Risks (including, but not limited to, pain, bleeding, infection, damage to nearby structures, treatment failure/recurrence, and the need for additional procedures), potential benefits, and alternatives were discussed with the patient. All questions were answered to the best of my abilities. The patient wishes to proceed. Written informed consent was obtained.      Riddhi Herrera MD

## 2024-12-18 ENCOUNTER — OFFICE VISIT (OUTPATIENT)
Dept: CARDIOLOGY | Facility: CLINIC | Age: 64
End: 2024-12-18
Payer: MEDICAID

## 2024-12-18 VITALS
HEART RATE: 74 BPM | BODY MASS INDEX: 24.87 KG/M2 | SYSTOLIC BLOOD PRESSURE: 110 MMHG | OXYGEN SATURATION: 96 % | WEIGHT: 131.75 LBS | HEIGHT: 61 IN | DIASTOLIC BLOOD PRESSURE: 70 MMHG

## 2024-12-18 DIAGNOSIS — I10 HYPERTENSION, UNSPECIFIED TYPE: ICD-10-CM

## 2024-12-18 DIAGNOSIS — F17.200 TOBACCO DEPENDENCE: Primary | ICD-10-CM

## 2024-12-18 DIAGNOSIS — I25.10 ASCVD (ARTERIOSCLEROTIC CARDIOVASCULAR DISEASE): ICD-10-CM

## 2024-12-18 PROCEDURE — 99204 OFFICE O/P NEW MOD 45 MIN: CPT | Mod: S$PBB,,,

## 2024-12-18 PROCEDURE — 3078F DIAST BP <80 MM HG: CPT | Mod: CPTII,,,

## 2024-12-18 PROCEDURE — 1159F MED LIST DOCD IN RCRD: CPT | Mod: CPTII,,,

## 2024-12-18 PROCEDURE — 99999 PR PBB SHADOW E&M-EST. PATIENT-LVL IV: CPT | Mod: PBBFAC,,,

## 2024-12-18 PROCEDURE — 3008F BODY MASS INDEX DOCD: CPT | Mod: CPTII,,,

## 2024-12-18 PROCEDURE — 99214 OFFICE O/P EST MOD 30 MIN: CPT | Mod: PBBFAC,PN

## 2024-12-18 PROCEDURE — 3074F SYST BP LT 130 MM HG: CPT | Mod: CPTII,,,

## 2024-12-18 PROCEDURE — G2211 COMPLEX E/M VISIT ADD ON: HCPCS | Mod: S$PBB,,,

## 2024-12-18 RX ORDER — AMLODIPINE BESYLATE 5 MG/1
5 TABLET ORAL DAILY
COMMUNITY

## 2024-12-18 NOTE — PROGRESS NOTES
[facility] - Cardiology [facility]  Cardiology Clinic Note      Chief Complaint  Chief Complaint   Patient presents with    Establish Care       HPI:  Ms. Hanson is a 63-year-old female with no significant cardiac history    Patient is new to me here to establish care  Referred by primary care provider  Blood pressure good today takes losartan 100 amlodipine 5 for hypertension  Also takes atorvastatin 20 and aspirin 81  Currently an everyday smoker  No complaints at this time and is doing well with medications  Denies chest pain, SOB, or difficulty breathing  Denies palpitations, lightheadedness, dizziness, or syncope/presyncope  Denies cough, LE edema, orthopnea, or PND  Denies falls or head injures  Denies fever, chills, or NVD  She is independent and active without any debilities    EKG normal sinus rhythm 60s nonspecific ST abnormalities    Medications  Current Outpatient Medications   Medication Sig Dispense Refill    albuterol (PROVENTIL/VENTOLIN HFA) 90 mcg/actuation inhaler Inhale 2 puffs into the lungs every 4 (four) hours as needed.      aspirin (ECOTRIN) 81 MG EC tablet Take 81 mg by mouth once daily.      atorvastatin (LIPITOR) 20 MG tablet Take 20 mg by mouth once daily.      FLUoxetine 20 MG capsule Take 60 mg by mouth once daily.      HYDROcodone-acetaminophen (NORCO) 5-325 mg per tablet Take 1 tablet by mouth every 8 (eight) hours as needed for Pain. 12 tablet 0    LORazepam (ATIVAN) 1 MG tablet TAKE ONE TABLET BY MOUTH TWICE DAILY IF NEEDED FOR 30 DAYS      losartan (COZAAR) 50 MG tablet Take 100 mg by mouth once daily.      VITAMIN D2 1,250 mcg (50,000 unit) capsule Take 50,000 Units by mouth every 7 days.      amLODIPine (NORVASC) 5 MG tablet Take 5 mg by mouth once daily.      amoxicillin-clavulanate 875-125mg (AUGMENTIN) 875-125 mg per tablet Take 1 tablet by mouth 2 (two) times daily. (Patient not taking: Reported on 12/18/2024) 14 tablet 0    azelastine (ASTELIN) 137 mcg (0.1 %) nasal spray 2  sprays 2 (two) times daily.      doxepin (SINEQUAN) 10 MG capsule Take 10 mg by mouth nightly.      doxepin (SINEQUAN) 25 MG capsule Take 25 mg by mouth once daily.      fluticasone propionate (FLONASE) 50 mcg/actuation nasal spray 1 spray by Each Nostril route once daily.      HYDROcodone-acetaminophen (NORCO) 5-325 mg per tablet Take 1 tablet by mouth every 6 (six) hours as needed for Pain. The medication you have been prescribed may cause drowsiness and impair your judgement.  Therefore, you should avoid driving, climbing, using machinery, etc., so as not to increase your risk of injury.  Do NOT drink any alcohol while on this medication(s). It is also addictive. 4 tablet 0    ibuprofen (ADVIL,MOTRIN) 800 MG tablet Take 800 mg by mouth 3 (three) times daily.      nicotine (NICODERM CQ) 21 mg/24 hr Apply 1 patch every day by transdermal route for 42 days.      ondansetron (ZOFRAN) 4 MG tablet Take 1 tablet (4 mg total) by mouth every 8 (eight) hours as needed for Nausea. 15 tablet 0     Current Facility-Administered Medications   Medication Dose Route Frequency Provider Last Rate Last Admin    acetaminophen tablet 650 mg  650 mg Oral Once PRN Percle, Bethany A., NP        acetaminophen tablet 650 mg  650 mg Oral Once PRN Percle, Bethany A., NP        albuterol inhaler 2 puff  2 puff Inhalation Q20 Min PRN Percle, Bethany A., NP        albuterol inhaler 2 puff  2 puff Inhalation Q20 Min PRN Percle, Bethany A., NP        diphenhydrAMINE capsule 25 mg  25 mg Oral Once PRN Percle, Bethany A., NP        diphenhydrAMINE capsule 25 mg  25 mg Oral Once PRN Percle, Bethany A., NP        EPINEPHrine (EPIPEN) 0.3 mg/0.3 mL pen injection 0.3 mg  0.3 mg Intramuscular PRN Percle, Bethany A., NP        EPINEPHrine (EPIPEN) 0.3 mg/0.3 mL pen injection 0.3 mg  0.3 mg Intramuscular PRN Percle, Bethany A., NP            History  Past Medical History:   Diagnosis Date    Asthma     Chronic hepatitis C     HLD (hyperlipidemia)      Hypertension     Vitamin D deficiency      Past Surgical History:   Procedure Laterality Date    APPENDECTOMY      AUGMENTATION OF BREAST Bilateral     HERNIA REPAIR      OPEN REDUCTION AND INTERNAL FIXATION (ORIF) OF FRACTURE OF DISTAL RADIUS Right 9/15/2020    Procedure: ORIF, FRACTURE, RADIUS, DISTAL;  Surgeon: Iraj Robertson MD;  Location: [facility];  Service: Orthopedics;  Laterality: Right;     Social History     Socioeconomic History    Marital status: Single   Tobacco Use    Smoking status: Every Day     Current packs/day: 1.00     Types: Cigarettes    Smokeless tobacco: Never   Substance and Sexual Activity    Alcohol use: Yes     Comment: occasionally    Drug use: Never    Sexual activity: Not Currently     Family History   Problem Relation Name Age of Onset    Hypertension Mother      Hypertension Sister      Hypertension Brother          Allergies  Review of patient's allergies indicates:  No Known Allergies    Review of Systems   Review of Systems   Constitutional: Negative for chills, decreased appetite, diaphoresis, fever, malaise/fatigue, weight gain and weight loss.   Eyes:  Negative for blurred vision.   Cardiovascular:  Negative for chest pain, claudication, dyspnea on exertion, irregular heartbeat, leg swelling, near-syncope, orthopnea, palpitations, paroxysmal nocturnal dyspnea and syncope.   Respiratory:  Negative for cough, shortness of breath, snoring, sputum production and wheezing.    Endocrine: Negative for cold intolerance, heat intolerance, polydipsia, polyphagia and polyuria.   Skin:  Negative for color change, dry skin, itching, nail changes and poor wound healing.   Musculoskeletal:  Negative for back pain, gout, joint pain and joint swelling.   Gastrointestinal:  Negative for bloating, abdominal pain, constipation, diarrhea, hematemesis, hematochezia, melena, nausea and vomiting.   Genitourinary:  Negative for dysuria and hematuria.   Neurological:  Negative for dizziness,  headaches, light-headedness, numbness, paresthesias and weakness.   Psychiatric/Behavioral:  Negative for altered mental status, depression and memory loss.        Physical Exam  Vitals:    12/18/24 1106   BP: 110/70   Pulse: 74     Wt Readings from Last 1 Encounters:   12/18/24 59.8 kg (131 lb 11.6 oz)     Physical Exam  Constitutional:       General: She is not in acute distress.  HENT:      Head: Normocephalic and atraumatic.      Mouth/Throat:      Mouth: Mucous membranes are moist.   Eyes:      Extraocular Movements: Extraocular movements intact.      Pupils: Pupils are equal, round, and reactive to light.   Neck:      Vascular: No carotid bruit or JVD.   Cardiovascular:      Rate and Rhythm: Normal rate and regular rhythm.      Heart sounds: No murmur heard.     No friction rub. No gallop.   Pulmonary:      Effort: Pulmonary effort is normal.      Breath sounds: Normal breath sounds.   Abdominal:      General: Abdomen is flat.      Palpations: Abdomen is soft.   Musculoskeletal:      Right lower leg: No edema.      Left lower leg: No edema.   Skin:     General: Skin is warm.      Capillary Refill: Capillary refill takes less than 2 seconds.   Neurological:      General: No focal deficit present.   Psychiatric:         Mood and Affect: Mood normal.         Labs  Admission on 11/29/2024, Discharged on 11/29/2024   Component Date Value Ref Range Status    WBC 11/29/2024 16.20 (H)  3.90 - 12.70 K/uL Final    RBC 11/29/2024 4.48  4.00 - 5.40 M/uL Final    Hemoglobin 11/29/2024 14.2  12.0 - 16.0 g/dL Final    Hematocrit 11/29/2024 42.8  37.0 - 48.5 % Final    MCV 11/29/2024 96  82 - 98 fL Final    MCH 11/29/2024 31.7 (H)  27.0 - 31.0 pg Final    MCHC 11/29/2024 33.2  32.0 - 36.0 g/dL Final    RDW 11/29/2024 13.3  11.5 - 14.5 % Final    Platelets 11/29/2024 469 (H)  150 - 450 K/uL Final    MPV 11/29/2024 9.6  9.2 - 12.9 fL Final    Immature Granulocytes 11/29/2024 0.4  0.0 - 0.5 % Final    Gran # (ANC) 11/29/2024  12.4 (H)  1.8 - 7.7 K/uL Final    Immature Grans (Abs) 11/29/2024 0.07 (H)  0.00 - 0.04 K/uL Final    Comment: Mild elevation in immature granulocytes is non specific and   can be seen in a variety of conditions including stress response,   acute inflammation, trauma and pregnancy. Correlation with other   laboratory and clinical findings is essential.      Lymph # 11/29/2024 2.7  1.0 - 4.8 K/uL Final    Mono # 11/29/2024 1.0  0.3 - 1.0 K/uL Final    Eos # 11/29/2024 0.1  0.0 - 0.5 K/uL Final    Baso # 11/29/2024 0.07  0.00 - 0.20 K/uL Final    nRBC 11/29/2024 0  0 /100 WBC Final    Gran % 11/29/2024 76.4 (H)  38.0 - 73.0 % Final    Lymph % 11/29/2024 16.6 (L)  18.0 - 48.0 % Final    Mono % 11/29/2024 5.9  4.0 - 15.0 % Final    Eosinophil % 11/29/2024 0.3  0.0 - 8.0 % Final    Basophil % 11/29/2024 0.4  0.0 - 1.9 % Final    Differential Method 11/29/2024 Automated   Final    Sodium 11/29/2024 140  136 - 145 mmol/L Final    Potassium 11/29/2024 3.9  3.5 - 5.1 mmol/L Final    Chloride 11/29/2024 108  95 - 110 mmol/L Final    CO2 11/29/2024 21 (L)  23 - 29 mmol/L Final    Glucose 11/29/2024 103  70 - 110 mg/dL Final    BUN 11/29/2024 14  8 - 23 mg/dL Final    Creatinine 11/29/2024 0.7  0.5 - 1.4 mg/dL Final    Calcium 11/29/2024 9.5  8.7 - 10.5 mg/dL Final    Total Protein 11/29/2024 7.6  6.0 - 8.4 g/dL Final    Albumin 11/29/2024 4.1  3.5 - 5.2 g/dL Final    Total Bilirubin 11/29/2024 0.4  0.1 - 1.0 mg/dL Final    Comment: For infants and newborns, interpretation of results should be based  on gestational age, weight and in agreement with clinical  observations.    Premature Infant recommended reference ranges:  Up to 24 hours.............<8.0 mg/dL  Up to 48 hours............<12.0 mg/dL  3-5 days..................<15.0 mg/dL  6-29 days.................<15.0 mg/dL      Alkaline Phosphatase 11/29/2024 61  40 - 150 U/L Final    AST 11/29/2024 14  10 - 40 U/L Final    ALT 11/29/2024 12  10 - 44 U/L Final    eGFR  11/29/2024 >60.0  >60 mL/min/1.73 m^2 Final    Anion Gap 11/29/2024 11  8 - 16 mmol/L Final    Group & Rh 11/29/2024 A POS   Final    Indirect [place] 11/29/2024 NEG   Final    Specimen Outdate 11/29/2024 12/02/2024 23:59   Final    QRS Duration 11/29/2024 70  ms Final    [facility] QTC Calculation 11/29/2024 456  ms Final    Prothrombin Time 11/29/2024 10.7  9.0 - 12.5 sec Final    INR 11/29/2024 1.0  0.8 - 1.2 Final    Comment: Coumadin Therapy:  2.0 - 3.0 for INR for all indicators except mechanical heart valves  and antiphospholipid syndromes which should use 2.5 - 3.5.  LOT^040^PT Inn^[id number]      Lipase 11/29/2024 14  4 - 60 U/L Final    POC Molecular Influenza A Ag 11/29/2024 Negative  Negative Final    POC Molecular Influenza B Ag 11/29/2024 Negative  Negative Final     Acceptable 11/29/2024 Yes   Final    POC Rapid COVID 11/29/2024 Negative  Negative Final     Acceptable 11/29/2024 Yes   Final   Hospital Outpatient Visit on 08/06/2024   Component Date Value Ref Range Status    QRS Duration 08/06/2024 80  ms Final    [facility] QTC Calculation 08/06/2024 465  ms Final       EKG  Reviewed    Echo   No results found for this or any previous visit.    Imaging  No results found.    Prior coronary angiogram / intervention:  No priors    Assessment and Plan  Ms. Hanson is a 63-year-old female with no significant cardiac history    Tobacco dependence  Patient current daily smoker  Counseled on smoking cessation fro greater than 10min   Decline referral to smoking cessation at this time    Hypertension, unspecified type  Controlled  Continue losartan and amlodipine    ASCVD (arteriosclerotic cardiovascular disease)  No symptoms of ACS  Continue atorvastatin 20 and aspirin 81    Follow Up  Follow up in about 6 months (around 6/18/2025), or if symptoms worsen or fail to improve.       Brandi R. Carter, FNP-C [facility] - Cardiology    Total professional time spent for the encounter:  45 minutes  Time  was spent preparing to see the patient, reviewing results of prior testing, obtaining and/or reviewing separately obtained history, performing a medically appropriate examination and interview, counseling and educating the patient/family, ordering medications/tests/procedures, referring and communicating with other health care professionals, documenting clinical information in the electronic health record, and independently interpreting results.

## 2025-01-01 PROBLEM — F17.200 TOBACCO DEPENDENCE: Status: ACTIVE | Noted: 2025-01-01

## 2025-01-01 PROBLEM — I25.10 ASCVD (ARTERIOSCLEROTIC CARDIOVASCULAR DISEASE): Status: ACTIVE | Noted: 2025-01-01

## 2025-01-01 PROBLEM — I10 HYPERTENSION: Status: ACTIVE | Noted: 2025-01-01

## 2025-03-13 ENCOUNTER — TELEPHONE (OUTPATIENT)
Dept: PAIN MEDICINE | Facility: CLINIC | Age: 65
End: 2025-03-13
Payer: MEDICAID